# Patient Record
Sex: FEMALE | Race: WHITE
[De-identification: names, ages, dates, MRNs, and addresses within clinical notes are randomized per-mention and may not be internally consistent; named-entity substitution may affect disease eponyms.]

---

## 2017-03-24 ENCOUNTER — HOSPITAL ENCOUNTER (EMERGENCY)
Dept: HOSPITAL 47 - EC | Age: 42
Discharge: HOME | End: 2017-03-24
Payer: SELF-PAY

## 2017-03-24 VITALS
TEMPERATURE: 97.5 F | DIASTOLIC BLOOD PRESSURE: 94 MMHG | SYSTOLIC BLOOD PRESSURE: 138 MMHG | RESPIRATION RATE: 17 BRPM | HEART RATE: 78 BPM
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
## 2017-03-24 DIAGNOSIS — M25.552: ICD-10-CM
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
## 2017-03-24 DIAGNOSIS — S76.912A: Primary | ICD-10-CM

## 2017-03-24 DIAGNOSIS — F17.200: ICD-10-CM

## 2017-03-24 PROCEDURE — 99283 EMERGENCY DEPT VISIT LOW MDM: CPT

## 2017-03-24 PROCEDURE — 73502 X-RAY EXAM HIP UNI 2-3 VIEWS: CPT

## 2017-03-24 NOTE — XR
EXAMINATION TYPE: XR Hip LT and AP Pelvis

 

DATE OF EXAM ORDERED: 3/24/2017 1:30 PM

 

HISTORY: Pain.

 

COMPARISON: None.

 

FINDINGS:  No fracture, dislocation or other acute osseous lesion is seen. There is slight overgrowth
 of the left acetabulum and there is a small "bump" on the left femoral head.

 

IMPRESSION: 

 

PLEASE CORRELATE CLINICALLY TO EXCLUDE FEMOROACETABULAR IMPINGEMENT SYNDROME.

## 2017-03-24 NOTE — ED
General Adult HPI





- General


Chief complaint: Extremity Injury, Lower


Stated complaint: left leg injury


Time Seen by Provider: 03/24/17 13:12


Source: patient, RN notes reviewed


Mode of arrival: wheelchair


Limitations: no limitations





- History of Present Illness


Initial comments: 


This is a 41-year-old female who presents with left hip pain 3 days.  Patient 

states she was climbing down from a ladder when she missed one step but was 

able to catch herself and did not fall off the ladder.  Patient states she must 

have twisted wrong because she's had pain in the posterior left hip and 

hamstring area for the last 3 days.  Patient states she has been ambulating but 

this is painful.  Patient denies any numbness/tingling or weakness.  Patient 

denies any back pain.  Patient denies any knee, ankle or foot pain.  Patient is 

on any anticoagulants.  Patient denies any recent fever, chills, shortness 

breath, chest pain, abdominal pain, nausea/vomiting/diarrhea, back pain, 

hematuria, headache, or visual changes, or any other complaints.








- Related Data


 Previous Rx's











 Medication  Instructions  Recorded


 


traMADol HCL [Ultram] 50 mg PO Q6HR #12 tab 03/24/17











 Allergies











Allergy/AdvReac Type Severity Reaction Status Date / Time


 


No Known Allergies Allergy   Verified 03/24/17 13:08














Review of Systems


ROS Statement: 


Those systems with pertinent positive or pertinent negative responses have been 

documented in the HPI.





ROS Other: All systems not noted in ROS Statement are negative.





Past Medical History


Past Medical History: No Reported History


History of Any Multi-Drug Resistant Organisms: None Reported


Past Surgical History: Cholecystectomy, Tubal Ligation


Past Psychological History: No Psychological Hx Reported


Smoking Status: Current every day smoker


Past Alcohol Use History: None Reported


Past Drug Use History: Marijuana





General Exam





- General Exam Comments


Initial Comments: 


General:  The patient is awake and alert, in no distress, and does not appear 

acutely ill.   


Neck:  The neck is supple, there is no tenderness or JVD.  


Cardiovascular:  There is a regular rate and rhythm. No murmur, rub or gallop 

is appreciated.


Respiratory:  Lungs are clear to auscultation, respirations are non-labored, 

breath sounds are equal.  No wheezes, stridor, rales, or rhonchi.


Musculoskeletal: There is tenderness to palpation of the lateral aspect of the 

left hip and to the lateral aspect of the posterior left thigh.  There is no 

swelling, erythema or ecchymosis.  Patient is able to stand and ambulate in the 

EC.  Patient has full range of motion, strength 5/5 and Sensation intact.  

Posterior tibial pulses are 2+ bilaterally.  Patient has no tenderness to the 

left knee, left hip,left ankle or left foot.


Neurological:  A&O x 3. CN II-XII intact, There are no obvious motor or sensory 

deficits. Coordination appears grossly intact. Speech is normal.


Skin:  Skin is warm and dry and no rashes or lesions are noted. 


Psychiatric:  Normal mood and affect.  





Limitations: no limitations





Course


 Vital Signs











  03/24/17





  13:08


 


Temperature 97.5 F L


 


Pulse Rate 78


 


Respiratory 17





Rate 


 


Blood Pressure 138/94


 


O2 Sat by Pulse 98





Oximetry 














Medical Decision Making





- Medical Decision Making


This is a 41-year-old female presents with left hand pain 2-3 days.  On 

physical exam there is tenderness to palpation of the lateral aspect of the 

left hip and to the lateral aspect of the posterior left thigh.  There is no 

swelling, erythema or ecchymosis.  Patient is able to stand and ambulate in the 

EC.  Patient has full range of motion, strength 5/5 and Sensation intact.  

Posterior tibial pulses are 2+ bilaterally.


X-rays of the left hip and AP pelvis were done and reviewed showing: Please 

correlate clinically to exclude femoroacetabular impingement syndrome.  Report 

by Dr. Elizalde. I discussed the results with patient.  Patient denies any history 

of previous hip injuries.  At this time I I observed the patient ambulate in 

the EC and she was able to do this with no difficulty but with some mild pain.  

Patient has full range of active and passive motion to the left hip and no pain 

with range of motion.  I discussed continuation of Motrin and Tylenol.  I 

discussed heating pads and ice packs to the area.  I discussed that patient 

should continue to ambulate as tolerated.  I discussed tramadol for 

breakthrough pain.  I discussed return parameters.Discussed that patient should 

follow up with PCP in one to 2 days or return to the EC for any worsening 

symptoms or for any further concerns.  Patient was receptive to this plan and 

patient will be discharged home.   








Disposition


Clinical Impression: 


 Sprain, hamstring





Disposition: HOME SELF-CARE


Condition: Good


Instructions:  Hamstring Injury (ED)


Additional Instructions: 


Please rest, ice and elevate the leg.  Please use ice packs and heating pads to 

the area.  Please use Tylenol and Motrin for pain and tramadol for any 

breakthrough pain.  Please follow-up with her primary care physician in one to 

2 days or return to the EC for any worsening symptoms or for any further 

concerns.


Prescriptions: 


traMADol HCL [Ultram] 50 mg PO Q6HR #12 tab


Referrals: 


None,Stated [Primary Care Provider] - 1-2 days


Gloria Biggs MD [STAFF PHYSICIAN] - 1-2 days


Polly Britt MD [STAFF PHYSICIAN] - 1-2 days


Time of Disposition: 13:58

## 2018-08-05 ENCOUNTER — HOSPITAL ENCOUNTER (EMERGENCY)
Dept: HOSPITAL 47 - EC | Age: 43
Discharge: HOME | End: 2018-08-05
Payer: COMMERCIAL

## 2018-08-05 VITALS — DIASTOLIC BLOOD PRESSURE: 67 MMHG | TEMPERATURE: 97 F | HEART RATE: 72 BPM | SYSTOLIC BLOOD PRESSURE: 147 MMHG

## 2018-08-05 VITALS — RESPIRATION RATE: 18 BRPM

## 2018-08-05 DIAGNOSIS — Z98.51: ICD-10-CM

## 2018-08-05 DIAGNOSIS — N93.8: Primary | ICD-10-CM

## 2018-08-05 DIAGNOSIS — Z90.49: ICD-10-CM

## 2018-08-05 DIAGNOSIS — F17.200: ICD-10-CM

## 2018-08-05 LAB
HYALINE CASTS UR QL AUTO: 1 /LPF (ref 0–2)
PH UR: 6.5 [PH] (ref 5–8)
RBC UR QL: 108 /HPF (ref 0–5)
SP GR UR: 1.02 (ref 1–1.03)
SQUAMOUS UR QL AUTO: 8 /HPF (ref 0–4)
UROBILINOGEN UR QL STRIP: 2 MG/DL (ref ?–2)
WBC #/AREA URNS HPF: 2 /HPF (ref 0–5)

## 2018-08-05 PROCEDURE — 81025 URINE PREGNANCY TEST: CPT

## 2018-08-05 PROCEDURE — 81001 URINALYSIS AUTO W/SCOPE: CPT

## 2018-08-05 PROCEDURE — 99284 EMERGENCY DEPT VISIT MOD MDM: CPT

## 2018-08-05 NOTE — ED
Female Urogenital HPI





- General


Chief complaint: Vaginal Bleeding


Stated complaint: Female 


Time Seen by Provider: 08/05/18 16:00


Source: patient, RN notes reviewed


Mode of arrival: ambulatory


Limitations: no limitations





- History of Present Illness


Initial comments: 


This is a 43-year-old female who presents to the emergency department with 

chief complaint of vaginal bleeding.  Patient states that she ended her period 

a week and a half ago.  She states that yesterday she developed light vaginal 

bleeding and has been spotting today.  She states she has not been using any 

tampons or pads as the bleeding has been light.  She denies possibility of 

pregnancy as she has had a tubal ligation.  Denies fevers or chills, chest pain 

or shortness of breath, abdominal pain, nausea or vomiting, diarrhea or 

constipation, dysuria or hematuria.





Last Menstrual Period: 07/23/18





- Related Data


 Previous Rx's











 Medication  Instructions  Recorded


 


traMADol HCL [Ultram] 50 mg PO Q6HR #12 tab 03/24/17











 Allergies











Allergy/AdvReac Type Severity Reaction Status Date / Time


 


No Known Allergies Allergy   Verified 08/05/18 15:53














Review of Systems


ROS Statement: 


Those systems with pertinent positive or pertinent negative responses have been 

documented in the HPI.





ROS Other: All systems not noted in ROS Statement are negative.





Past Medical History


Past Medical History: No Reported History


History of Any Multi-Drug Resistant Organisms: None Reported


Past Surgical History: Cholecystectomy, Tubal Ligation


Past Psychological History: No Psychological Hx Reported


Smoking Status: Current every day smoker


Past Alcohol Use History: None Reported


Past Drug Use History: Marijuana





General Exam





- General Exam Comments


Initial Comments: 





General: Awake and alert, well-developed; in no apparent distress.


HEENT: Head atraumatic, normocephalic. Pupils are equal, round and reactive to 

light. Extraocular movements intact. Oropharynx moist without erythema or 

exudate. 


Neck: Supple. Normal ROM. 


Cardiovascular: Regular rate and rhythm. No murmurs, rubs or gallops. Chest 

symmetrical.  


Respiratory: Lungs clear to auscultation bilaterally. No wheezes, rales or 

rhonchi. Normal respiratory effort with no use of accessory muscles. 


Abdomen: Soft, non-tender, non-distended. No rigidity, rebound or guarding. 

Normal bowel sounds in all 4 quadrants. 


Musculoskeletal: Normal ROM, no tenderness bilateral upper and lower 

extremities. Ambulating normally. 


Skin: Pink, warm and dry without rashes or lesions. 


Neurological: Alert and oriented x3. CN II-XII grossly intact. Speech is fluent 

and answers are appropriate. No focal neuro deficits. 


Psychiatric: Normal mood and affect. No overt signs of depression or anxiety 

noted. 











Limitations: no limitations


External exam: Present: normal external exam.  Absent: erythema, swelling, 

lesions, lacerations


Speculum exam: Present: normal speculum exam, vaginal bleeding (very minimal).  

Absent: erythema, vaginal discharge, cervical discharge


By manual exam: Present: adnexal tenderness (right ).  Absent: cervical motion 

tenderness





Course


 Vital Signs











  08/05/18





  15:51


 


Temperature 98.1 F


 


Pulse Rate 89


 


Respiratory 18





Rate 


 


Blood Pressure 165/95


 


O2 Sat by Pulse 98





Oximetry 














Medical Decision Making





- Medical Decision Making


This is a 43-year-old female who presents to the emergency department with 

chief complaint of vaginal bleeding.  Patient states that she began to have 

vaginal spotting yesterday.  She states that this is abnormal for her as she 

normally has regular periods and just ended it as a week and a half ago.  UA 

revealed blood within the urine but no signs of infection.  Pelvic exam was 

performed.  Minimal vaginal bleeding and right adnexal tenderness.  Discussed 

obtaining an ultrasound and patient refuses at this time.  Patient states she 

would like to be discharged home.  Vital signs are stable and she is in no 

acute distress.  She will be discharged home at this time.  All questions 

answered.








- Lab Data


 Lab Results











  08/05/18 08/05/18 Range/Units





  16:18 16:18 


 


Urine Color   Yellow  


 


Urine Appearance   Clear  (Clear)  


 


Urine pH   6.5  (5.0-8.0)  


 


Ur Specific Gravity   1.022  (1.001-1.035)  


 


Urine Protein   Trace H  (Negative)  


 


Urine Glucose (UA)   Negative  (Negative)  


 


Urine Ketones   Negative  (Negative)  


 


Urine Blood   Large H  (Negative)  


 


Urine Nitrite   Negative  (Negative)  


 


Urine Bilirubin   Negative  (Negative)  


 


Urine Urobilinogen   2.0  (<2.0)  mg/dL


 


Ur Leukocyte Esterase   Negative  (Negative)  


 


Urine RBC   108 H  (0-5)  /hpf


 


Urine WBC   2  (0-5)  /hpf


 


Ur Squamous Epith Cells   8 H  (0-4)  /hpf


 


Amorphous Sediment   Occasional H  (None)  /hpf


 


Urine Bacteria   Rare H  (None)  /hpf


 


Hyaline Casts   1  (0-2)  /lpf


 


Urine Mucus   Occasional H  (None)  /hpf


 


Urine HCG, Qual  Not Detected   (Not Detectd)  














Disposition


Clinical Impression: 


 Dysfunctional uterine bleeding





Disposition: HOME SELF-CARE


Condition: Good


Instructions:  Dysfunctional Uterine Bleeding (ED)


Additional Instructions: 


Please follow up with primary care provider within 1-2 days. Return to 

emergency department if symptoms should worsen or any concerns arise. 


Is patient prescribed a controlled substance at d/c from ED?: No


Referrals: 


None,Stated [Primary Care Provider] - 1-2 days


Time of Disposition: 16:59

## 2022-12-15 ENCOUNTER — HOSPITAL ENCOUNTER (EMERGENCY)
Dept: HOSPITAL 47 - EC | Age: 47
Discharge: HOME | End: 2022-12-15
Payer: COMMERCIAL

## 2022-12-15 VITALS — TEMPERATURE: 97.8 F | RESPIRATION RATE: 16 BRPM

## 2022-12-15 VITALS — DIASTOLIC BLOOD PRESSURE: 92 MMHG | SYSTOLIC BLOOD PRESSURE: 164 MMHG | HEART RATE: 83 BPM
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
## 2022-12-15 DIAGNOSIS — F12.90: ICD-10-CM

## 2022-12-15 DIAGNOSIS — S22.059A: Primary | ICD-10-CM

## 2022-12-15 PROCEDURE — 96372 THER/PROPH/DIAG INJ SC/IM: CPT

## 2022-12-15 PROCEDURE — 72128 CT CHEST SPINE W/O DYE: CPT

## 2022-12-15 PROCEDURE — 72100 X-RAY EXAM L-S SPINE 2/3 VWS: CPT

## 2022-12-15 PROCEDURE — 99284 EMERGENCY DEPT VISIT MOD MDM: CPT

## 2022-12-15 PROCEDURE — 72070 X-RAY EXAM THORAC SPINE 2VWS: CPT

## 2022-12-15 PROCEDURE — 71046 X-RAY EXAM CHEST 2 VIEWS: CPT

## 2022-12-15 PROCEDURE — 72050 X-RAY EXAM NECK SPINE 4/5VWS: CPT

## 2022-12-15 NOTE — XR
EXAMINATION TYPE: XR cervical spine comp

 

DATE OF EXAM: 12/15/2022

 

COMPARISON: None

 

HISTORY: Fall, neck pain

 

TECHNIQUE: 5 view cervical spine

 

FINDINGS: Prevertebral space is normal. Some minimal retrolisthesis of C5 on C6 may be present. Degen
erative disc changes present at C5-6 due to mild degree. Posterior spinal lamellar line is intact. So
me foraminal narrowing is present at C5-6 on the right. Foraminal narrowing at C3-4-5 is present on t
he left. Odontoid tip is limited due to overlying occiput.

 

No acute fractures are evident. Follow-up can be performed as clinically indicated.

 

IMPRESSION:

1.  No acute osseous abnormality cervical spine.

2. Foraminal narrowing on the right at C5-6 and on the left at C4-5.

3. Mild degenerative disc change C5-6

## 2022-12-15 NOTE — XR
EXAMINATION TYPE: XR lumbar spine 2 or 3V

 

DATE OF EXAM: 12/15/2022

 

COMPARISON: None

 

HISTORY: Fall, pain

 

TECHNIQUE: 5 view lumbar spine

 

FINDINGS: There are 5 lumbar-type vertebral bodies. Pedicles are intact. Facet degenerative changes p
resent L4-5 and L5-S1. No spondylolytic defects are evident. Vertebral body alignment is preserved. B
gila heights are preserved.

 

IMPRESSION:

1.  No acute osseous abnormality lumbar spine

## 2022-12-15 NOTE — CT
EXAMINATION TYPE: CT thoracic spine wo con

CT DLP: 535.3 mGycm, Automated exposure control for dose reduction was used.

 

DATE OF EXAM: 12/15/2022 7:53 AM

 

COMPARISON: Radiograph same day.  

 

CLINICAL INDICATION:Female, 47 years old with history of back pain, fall

 

TECHNIQUE: Axial images of the thoracic spine were obtained without contrast. Coronal and sagittal re
formats were performed.

 

FINDINGS:  There is wedging of the T6 vertebral body with lucent fracture lines best appreciated on c
oronal imaging. There is no evidence of retropulsion. Approximately 25% height loss. The remainder of
 the thoracic vertebral bodies have preserved heights and alignment.  Intervertebral discs and osseou
s structures  have normal appearance. 

I do not see any evidence of extradural defects nor significant spinal canal narrowing at any thoraci
c vertebral body level.

 

IMPRESSION:

T6 wedge compression fracture with subsequent 25% height loss anteriorly. No retropulsion.

## 2022-12-15 NOTE — XR
EXAMINATION TYPE: XR chest 2V

 

DATE OF EXAM: 12/15/2022

 

COMPARISON: NONE

 

HISTORY: Fall injury with chest pain.

 

TECHNIQUE:  Frontal and lateral views of the chest are obtained.

 

FINDINGS:  There is no focal air space opacity, pleural effusion, or pneumothorax seen.  The cardiac 
silhouette size is within normal limits.   The osseous structures are intact. Cholecystectomy clips a
re seen.

 

IMPRESSION:  No acute process.

## 2022-12-15 NOTE — ED
General Adult HPI





- General


Chief complaint: Fall


Stated complaint: Fall


Time Seen by Provider: 12/15/22 06:13


Source: patient, RN notes reviewed


Mode of arrival: wheelchair


Limitations: no limitations





- History of Present Illness


Initial comments: 


47 year-old  female with no significant past medical history presents 

to the emergency department after a fall.  Notes she fell this morning after she

slipped on ice on her porch.  She says she slid down 6 or 7 steps.  She denies 

hitting her head, any loss of consciousness, any anticoagulant use.  She is 

complaining of neck pain, mid back pain, and notes it's hard to take a deep 

breath.  She denies dizziness or lightheadedness. 





- Related Data


                                  Previous Rx's











 Medication  Instructions  Recorded


 


traMADol HCL [Ultram] 50 mg PO Q6HR #12 tab 03/24/17


 


Lidocaine 5% Patch [Lidoderm 5% 1 patch TOPICAL DAILY 5 Days #5 12/15/22





Patch] patch 











                                    Allergies











Allergy/AdvReac Type Severity Reaction Status Date / Time


 


No Known Allergies Allergy   Verified 12/15/22 06:04














Review of Systems


ROS Statement: 


Those systems with pertinent positive or pertinent negative responses have been 

documented in the HPI.





ROS Other: All systems not noted in ROS Statement are negative.





Past Medical History


Past Medical History: No Reported History


History of Any Multi-Drug Resistant Organisms: None Reported


Past Surgical History: Cholecystectomy, Tubal Ligation


Past Psychological History: No Psychological Hx Reported


Smoking Status: Current every day smoker


Past Alcohol Use History: None Reported


Past Drug Use History: Marijuana





General Exam


Limitations: no limitations


General appearance: alert, in no apparent distress


Head exam: Present: atraumatic, normocephalic, normal inspection


Eye exam: Present: normal appearance, PERRL, EOMI.  Absent: scleral icterus, 

conjunctival injection, periorbital swelling


ENT exam: Present: normal exam, mucous membranes moist


Neck exam: Present: normal inspection.  Absent: tenderness, meningismus, 

lymphadenopathy


Respiratory exam: Present: normal lung sounds bilaterally.  Absent: respiratory 

distress, wheezes, rales, rhonchi, stridor, accessory muscle use, decreased 

breath sounds


Cardiovascular Exam: Present: regular rate, normal rhythm, normal heart sounds. 

Absent: systolic murmur, diastolic murmur, rubs, gallop, clicks


Back exam: Present: normal inspection, tenderness (thoracic spine paraspinal 

muscles tender to palpation, no obvious signs of trauma, erythema, edema. no 

step off. ), paraspinal tenderness.  Absent: full ROM, vertebral tenderness, 

rash noted


Neurological exam: Present: alert, oriented X3, CN II-XII intact


Psychiatric exam: Present: normal affect, normal mood


Skin exam: Present: warm, dry, intact, normal color.  Absent: rash





Course


                                   Vital Signs











  12/15/22 12/15/22 12/15/22





  06:05 07:25 09:52


 


Temperature 97.8 F  


 


Pulse Rate 60 61 83


 


Respiratory 16 16 16





Rate   


 


Blood Pressure 206/90 175/95 164/92


 


O2 Sat by Pulse 98 100 99





Oximetry   














Medical Decision Making





- Medical Decision Making


47-year-old  female presenting to the emergency department after a fall

that occurred this morning. Patient had imaging in the ED.  I interpreted the 

following the following: X-ray with wedging at the T6 vertebral body with lucent

fracture lines approximately 25% height loss.  Remainder of the thoracic 

vertebral bodies present there height and alignment acute fracture.  Patient was

given Toradol and Lidoderm patch with symptomatic improvement in the ED.  I 

discussed results with the patient the importance of follow-up with orthopedist.

All concerns addressed.  Return precautions discussed patient verbalized 

understanding. Patient was discharged in stable condition.  Discussed with Dr. Hopson.





Disposition


Clinical Impression: 


 Fall, Compression fracture of T6 vertebra





Disposition: HOME SELF-CARE


Condition: Stable


Instructions (If sedation given, give patient instructions):  Vertebral 

Compression Fracture (ED), Back Pain (ED)


Additional Instructions: 


Physical return to the nearest ER if symptoms patient changes, dizziness, 

shortness of breath.


Prescriptions: 


Lidocaine 5% Patch [Lidoderm 5% Patch] 1 patch TOPICAL DAILY 5 Days #5 patch


Is patient prescribed a controlled substance at d/c from ED?: No


Referrals: 


Phuong Eldridge MD [Primary Care Provider] - 1-2 days


JAZZY Messina DO [Doctor of Osteopathic Medicine] - 1-2 days


Time of Disposition: 08:48

## 2023-07-27 ENCOUNTER — HOSPITAL ENCOUNTER (EMERGENCY)
Dept: HOSPITAL 47 - EC | Age: 48
Discharge: HOME | End: 2023-07-27
Payer: COMMERCIAL

## 2023-07-27 VITALS — TEMPERATURE: 98 F | RESPIRATION RATE: 18 BRPM

## 2023-07-27 VITALS — SYSTOLIC BLOOD PRESSURE: 148 MMHG | DIASTOLIC BLOOD PRESSURE: 83 MMHG | HEART RATE: 76 BPM
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
## 2023-07-27 DIAGNOSIS — F12.90: ICD-10-CM

## 2023-07-27 DIAGNOSIS — F17.200: ICD-10-CM

## 2023-07-27 DIAGNOSIS — S93.401A: Primary | ICD-10-CM

## 2023-07-27 PROCEDURE — 96372 THER/PROPH/DIAG INJ SC/IM: CPT

## 2023-07-27 PROCEDURE — 73610 X-RAY EXAM OF ANKLE: CPT

## 2023-07-27 PROCEDURE — 99283 EMERGENCY DEPT VISIT LOW MDM: CPT

## 2023-07-27 PROCEDURE — 73630 X-RAY EXAM OF FOOT: CPT

## 2023-07-27 NOTE — XR
PROCEDURE: XR ankle complete RT - 3V

DATE AND TIME: 7/27/2023 7:22 PM

 

CLINICAL INDICATION: Pain after trauma

 

TECHNIQUE: Department protocol

 

COMPARISON: None

 

FINDINGS: 

There is no fracture or malalignment. The soft tissues are unremarkable.

 

 

IMPRESSION:

No acute radiographic process.

## 2023-07-27 NOTE — XR
PROCEDURE: XR foot complete RT - 3V

DATE AND TIME: 7/27/2023 7:22 PM

 

CLINICAL INDICATION: Pain after trauma

 

TECHNIQUE: Department protocol

 

COMPARISON: None available.

 

FINDINGS: 

There is no fracture or malalignment. 

 

There is a 1 cm calcification seen on the lateral view, superimposed over the expected position of th
e plantar aponeurosis. This appears to be a chronic finding, but palpation correlation is requested t
o ensure no tenderness at this site. There is, if  there was focal tenderness then an injury to the p
lantar aponeurosis could be considered, and could be further evaluated with MRI.

 

The soft tissues are otherwise unremarkable.

 

 

IMPRESSION:

Negative for fracture/malalignment.

 

Soft tissue finding for which a palpation correlation is requested.

## 2023-07-27 NOTE — ED
General Adult HPI





- General


Chief complaint: Extremity Injury, Lower


Stated complaint: Rt foot injury


Time Seen by Provider: 07/27/23 20:11


Source: patient, RN notes reviewed


Mode of arrival: wheelchair


Limitations: no limitations





- History of Present Illness


Initial comments: 





48-year-old female presents emergency Department with chief complaint of right 

foot pain.  She states that earlier today she dropped a can on her foot from 

about 5ft high.  She states she has pain in the right lateral foot that is worse

with movement.  Patient states that she did not take anything for pain.  She 

reports no significant past medical history.  No known medication ALLERGIES.





- Related Data


                                  Previous Rx's











 Medication  Instructions  Recorded


 


traMADol HCL [Ultram] 50 mg PO Q6HR #12 tab 03/24/17


 


Lidocaine 5% Patch [Lidoderm 5% 1 patch TOPICAL DAILY 5 Days #5 12/15/22





Patch] patch 











                                    Allergies











Allergy/AdvReac Type Severity Reaction Status Date / Time


 


No Known Allergies Allergy   Verified 12/15/22 06:04














Review of Systems


ROS Statement: 


Those systems with pertinent positive or pertinent negative responses have been 

documented in the HPI.





ROS Other: All systems not noted in ROS Statement are negative.





Past Medical History


Past Medical History: No Reported History


History of Any Multi-Drug Resistant Organisms: None Reported


Past Surgical History: Cholecystectomy, Tubal Ligation


Past Psychological History: No Psychological Hx Reported


Smoking Status: Current every day smoker


Past Alcohol Use History: None Reported


Past Drug Use History: Marijuana





General Exam


Limitations: no limitations


General appearance: alert, in no apparent distress


Head exam: Present: atraumatic, normocephalic, normal inspection


Eye exam: Present: normal appearance, PERRL, EOMI.  Absent: scleral icterus, 

conjunctival injection, periorbital swelling


ENT exam: Present: normal exam, mucous membranes moist


Neck exam: Present: normal inspection.  Absent: tenderness, meningismus, 

lymphadenopathy


Respiratory exam: Present: normal lung sounds bilaterally.  Absent: respiratory 

distress, wheezes, rales, rhonchi, stridor


Cardiovascular Exam: Present: regular rate, normal rhythm, normal heart sounds. 

Absent: systolic murmur, diastolic murmur, rubs, gallop, clicks


GI/Abdominal exam: Present: soft, normal bowel sounds.  Absent: distended, 

tenderness, guarding, rebound, rigid


Extremities exam: Present: tenderness (rt foot, ankle), normal capillary refill,

other (DP and PT pulses 2+)


Back exam: Present: normal inspection


Neurological exam: Present: alert, oriented X3


Psychiatric exam: Present: normal affect, normal mood


Skin exam: Present: warm, dry, normal color, abrasion (Right foot).  Absent: 

rash





Course


                                   Vital Signs











  07/27/23 07/27/23





  18:52 22:05


 


Temperature 98 F 


 


Pulse Rate 69 76


 


Respiratory 18 18





Rate  


 


Blood Pressure 181/104 148/83


 


O2 Sat by Pulse 97 98





Oximetry  














Medical Decision Making





- Medical Decision Making


Was pt. sent in by a medical professional or institution (, PA, NP, urgent 

care, hospital, or nursing home...) When possible be specific


@  -No


Did you speak to anyone other than the patient for history (EMS, parent, family,

police, friend...)? What history was obtained from this source 


@  -No


Did you review nursing and triage notes (agree or disagree)?  Why? 


@  -I reviewed and agree with nursing and triage notes


Were old charts reviewed (outside hosp., previous admission, EMS record, old 

EKG, old radiological studies, urgent care reports/EKG's, nursing home records)?

Report findings 


@  -No old charts were reviewed


Differential Diagnosis (chest pain, altered mental status, abdominal pain women,

abdominal pain men, vaginal bleeding, weakness, fever, dyspnea, syncope, 

headache, dizziness, GI bleed, back pain, seizure, CVA, palpatations, mental 

health, musculoskeletal)? 


@  -Differential Musculoskeletal


Muscular strain, contusion, ligament sprain, fracture, arthritis, septic 

arthritis, bursitis, cellulitis, muscle spasm, nerve compression, DVT, arterial 

occlusion, herpes zoster, electrolyte abnormality, tumor.... This is not meant 

to be in all inclusive list


EKG interpreted by me (3pts min.).


@  -none


X-rays interpreted by me (1pt min.).


@  -X-ray no fracture or malalignment, 1 cm calcification over the plantar 

aponeurosis; x-ray ankle showed no evidence for acute fracture


CT interpreted by me (1pt min.).


@  -None done


U/S interpreted by me (1pt. min.).


@  -None done


What testing was considered but not performed or refused? (CT, X-rays, U/S, 

labs)? Why?


@  -None


What meds were considered but not given or refused? Why?


@  -None


Did you discuss the management of the patient with other professionals 

(professionals i.e. , PA, NP, lab, RT, psych nurse, , , 

teacher, , )? Give summary


@  -No


Was smoking cessation discussed for >3mins.?


@  -No


Was critical care preformed (if so, how long)?


@  -No


Were there social determinants of health that impacted care today? How? 

(Homelessness, low income, unemployed, alcoholism, drug addiction, 

transportation, low edu. Level, literacy, decrease access to med. care, detention, 

rehab)?


@  -No


Was there de-escalation of care discussed even if they declined (Discuss DNR or 

withdrawal of care, Hospice)? DNR status


@  -No


What co-morbidities impacted this encounter? (DM, HTN, Smoking, COPD, CAD, 

Cancer, CVA, ARF, Chemo, Hep., AIDS, mental health diagnosis, sleep apnea, 

morbid obesity)?


@  -None


Was patient admitted / discharged? Hospital course, mention meds given and 

route, prescriptions, significant lab abnormalities, going to OR and other 

pertinent info.


@  -Discharged.  Patient presented to emergency department chief complaint of 

right foot and ankle pain after dropping a can on her foot.  Patient is in a 

significant amount of pain on evaluation.  Patient was given Tylenol and Toradol

which she states improved her pain.  Because of the amount of pain patient is in

just placed in a short-leg splint and advised to follow up with orthopedics. 

Patient stable at time of discharge.  


Undiagnosed new problem with uncertain prognosis?


@  -No


Drug Therapy requiring intensive monitoring for toxicity (Heparin, Nitro, 

Insulin, Cardizem)?


@  -No


Were any procedures done?


@  -No


Diagnosis/symptom?


@  -ankle sprain


Acute, or Chronic, or Acute on Chronic?


@  -acute


Uncomplicated (without systemic symptoms) or Complicated (systemic symptoms)?


@  -uncomplicated


Side effects of treatment?


@  -No


Exacerbation, Progression, or Severe Exacerbation?


@  -No


Poses a threat to life or bodily function? How? (Chest pain, USA, MI, pneumonia,

PE, COPD, DKA, ARF, appy, cholecystitis, CVA, Diverticulitis, Homicidal, 

Suicidal, threat to staff... and all critical care pts)


@  -No


Diagnosis/symptom?


@  -foot contusion


Acute, or Chronic, or Acute on Chronic?


@  -acute


Uncomplicated (without systemic symptoms) or Complicated (systemic symptoms)?


@  -uncomplicated


Side effects of treatment?


@  -none


Exacerbation, Progression, or Severe Exacerbation]


@  -no


Poses a threat to life or bodily function?


@  -no





Disposition


Clinical Impression: 


 Ankle sprain





Disposition: HOME SELF-CARE


Condition: Stable


Instructions (If sedation given, give patient instructions):  Ankle Sprain (ED)


Additional Instructions: 


Follow-up with orthopedics early next week.  Return to the emergency department 

for new or worsening symptoms.


Is patient prescribed a controlled substance at d/c from ED?: No


Referrals: 


Phuong Eldridge MD [Primary Care Provider] - 1-2 days


Omi Aragon DO [Doctor of Osteopathic Medicine] - 1-2 days


Time of Disposition: 21:41